# Patient Record
Sex: FEMALE | Race: BLACK OR AFRICAN AMERICAN | NOT HISPANIC OR LATINO | Employment: FULL TIME | URBAN - METROPOLITAN AREA
[De-identification: names, ages, dates, MRNs, and addresses within clinical notes are randomized per-mention and may not be internally consistent; named-entity substitution may affect disease eponyms.]

---

## 2022-07-13 ENCOUNTER — HOSPITAL ENCOUNTER (EMERGENCY)
Facility: HOSPITAL | Age: 27
Discharge: HOME | End: 2022-07-13
Payer: MEDICAID

## 2022-07-13 VITALS
OXYGEN SATURATION: 100 % | HEIGHT: 62 IN | HEART RATE: 88 BPM | DIASTOLIC BLOOD PRESSURE: 72 MMHG | TEMPERATURE: 99 F | BODY MASS INDEX: 37.54 KG/M2 | WEIGHT: 204 LBS | SYSTOLIC BLOOD PRESSURE: 117 MMHG

## 2022-07-13 PROCEDURE — 99284 EMERGENCY DEPT VISIT MOD MDM: CPT

## 2022-07-13 NOTE — ED PROVIDER NOTES
"[facility] NOTE     Admit Date: 2022  [facility] Physician: Vishal Castro  Primary OBGYN: No Attending/OB Hospitalist Group Admit    Admit Diagnosis/Chief Complaint: Pelvic Pain      Chief Complaint   Patient presents with    Abdominal Pain       Hospital Course:  Stephanie De La Rosa is a 26 y.o.  at 30w6d presents complaining of pelvic pain and pressure was concern of fetal well-being  This IUP is complicated by na.    Patient denies vaginal bleeding, leakage of fluid and contractions.  Fetal Movement: normal.    Temp 98.5 °F (36.9 °C) (Oral)   Ht 5' 2" (1.575 m)   Wt 92.5 kg (204 lb)   BMI 37.31 kg/m²   Temp:  [98.5 °F (36.9 °C)] 98.5 °F (36.9 °C)    General: in no apparent distress  Abdominal: soft, nontender, nondistended, no abnormal masses, no epigastric pain FHT present  Back: lumbar tenderness absent   CVA tenderness none  Extremeties no redness or tenderness in the calves or thighs no edema    SSE:   SVE:      FHT:  Reactive  TOCO: Contractions none      LABS:   No results found for this or any previous visit (from the past 24 hour(s)).  [unfilled]     Imaging Results    None          ASSESMENT: Stephanie De La Rosa is a 26 y.o.   at 30w6d with suspected round ligament pain and rule out labor  Observation in [facility]  Round ligament pain discussed conservative treatment  Labor precautions reviewed with patient  ER precautions reviewed with patient  Patient was given an opportunity to ask questions  Patient is to follow-up with her primary care physician  Patient currently stable      Discharge Diagnosis:  Round ligament pain    Status:Stable    Patient Instructions:       - Pt was given routine pregnancy instructions including to return to triage if she had any vaginal bleeding (other than spotting for the next 48hrs), any loss of fluid like her water broke, decreased fetal movement, or contractions Q 5min lasting for 2 or more hours. Pt was also instructed to drink copious water. Patient voiced " understanding of all these instructions and was subsequently discharged home.    She will follow up with her primary OB.      This note was created with the assistance of [employer] voice recognition software. There may be transcription errors as a result of using this technology however minimal. Effort has been made to assure accuracy of transcription but any obvious errors or omissions should be clarified with the author of the document.

## 2022-08-01 ENCOUNTER — HOSPITAL ENCOUNTER (EMERGENCY)
Facility: HOSPITAL | Age: 27
Discharge: HOME | End: 2022-08-01
Admitting: OBSTETRICS & GYNECOLOGY
Payer: MEDICAID

## 2022-08-01 VITALS
RESPIRATION RATE: 18 BRPM | HEART RATE: 72 BPM | SYSTOLIC BLOOD PRESSURE: 111 MMHG | DIASTOLIC BLOOD PRESSURE: 67 MMHG | TEMPERATURE: 98 F | OXYGEN SATURATION: 100 %

## 2022-08-01 PROCEDURE — 99284 EMERGENCY DEPT VISIT MOD MDM: CPT

## 2022-08-02 NOTE — ED PROVIDER NOTES
[facility] NOTE     Admit Date: 2022  [facility] Physician: Vishal Castro  Primary OBGYN: No Attending/OB Hospitalist Group Admit    Admit Diagnosis/Chief Complaint: Swelling      Chief Complaint   Patient presents with    Leg Swelling     Bilat swelling in feet/lower legs       Hospital Course:  Stephanie De La Rosa is a 26 y.o.  at 33w4d presents with swelling and concerned about pre eclampsia  This IUP is complicated by Acute diarrhea     Patient denies vaginal bleeding, leakage of fluid, contractions, headache, vision changes, RUQ pain and nausea/vomiting. No SOB  Fetal Movement: normal.    /68   Pulse 74   Temp 98.4 °F (36.9 °C)   Resp 18   SpO2 100%   Temp:  [98.4 °F (36.9 °C)] 98.4 °F (36.9 °C)  Pulse:  [74] 74  Resp:  [18] 18  SpO2:  [100 %] 100 %  BP: (131)/(68) 131/68    General: in no apparent distress  Abdominal: soft, nontender, nondistended, no abnormal masses, no epigastric pain FHT present  Back: not examined   CVA tenderness not examined  Extremeties no redness or tenderness in the calves or thighs edema 2+    Reflexes +2,no Clonus    SVE:      FHT:  Reactive  TOCO: Contractions irritability ptn comfotable      LABS:   No results found for this or any previous visit (from the past 24 hour(s)).  [unfilled]     Imaging Results    None          ASSESMENT: Stephanie De La Rosa is a 26 y.o.   at 33w4d with swelling in Pregnancy  Obs in [facility]  Normotensive and asymptomatic   Discussed adequate Hydration  ER precautions reviewed with patient  Patient was given an opportunity to ask questions  Patient currently stable      Discharge Diagnosis: Swelling in / rule out Preeclampsia  Status:Stable    Patient Instructions:   -Hypertensive precautions reviewed    - Pt was given routine pregnancy instructions including to return to triage if she had any vaginal bleeding (other than spotting for the next 48hrs), any loss of fluid like her water broke, decreased fetal movement, or contractions Q 5min lasting  for 2 or more hours. Pt was also instructed to drink copious water. Patient voiced understanding of all these instructions and was subsequently discharged home.    She will follow up with her primary OB.        This note was created with the assistance of [employer] voice recognition software. There may be transcription errors as a result of using this technology however minimal. Effort has been made to assure accuracy of transcription but any obvious errors or omissions should be clarified with the author of the document.

## 2022-08-02 NOTE — ED NOTES
D/C instructions given/reviewed with pt and pts . Pt states understanding of all D/C instructions given and states she has no further questions or concerns at this time.Pt states she is ready/feels safe to be discharged home per MD order.